# Patient Record
Sex: FEMALE | Race: WHITE | NOT HISPANIC OR LATINO | URBAN - METROPOLITAN AREA
[De-identification: names, ages, dates, MRNs, and addresses within clinical notes are randomized per-mention and may not be internally consistent; named-entity substitution may affect disease eponyms.]

---

## 2019-03-12 ENCOUNTER — INPATIENT (INPATIENT)
Facility: HOSPITAL | Age: 84
LOS: 2 days | Discharge: NURSING, REHAB OR LONG-TERM CARE | DRG: 56 | End: 2019-03-15
Attending: INTERNAL MEDICINE | Admitting: INTERNAL MEDICINE
Payer: MEDICARE

## 2019-03-12 VITALS
SYSTOLIC BLOOD PRESSURE: 177 MMHG | DIASTOLIC BLOOD PRESSURE: 89 MMHG | HEART RATE: 63 BPM | TEMPERATURE: 98 F | HEIGHT: 65 IN | OXYGEN SATURATION: 94 % | RESPIRATION RATE: 18 BRPM | WEIGHT: 125 LBS

## 2019-03-12 DIAGNOSIS — I10 ESSENTIAL (PRIMARY) HYPERTENSION: ICD-10-CM

## 2019-03-12 DIAGNOSIS — D50.9 IRON DEFICIENCY ANEMIA, UNSPECIFIED: ICD-10-CM

## 2019-03-12 DIAGNOSIS — K21.9 GASTRO-ESOPHAGEAL REFLUX DISEASE WITHOUT ESOPHAGITIS: ICD-10-CM

## 2019-03-12 DIAGNOSIS — F03.90 UNSPECIFIED DEMENTIA WITHOUT BEHAVIORAL DISTURBANCE: ICD-10-CM

## 2019-03-12 DIAGNOSIS — Z29.9 ENCOUNTER FOR PROPHYLACTIC MEASURES, UNSPECIFIED: ICD-10-CM

## 2019-03-12 LAB
ALBUMIN SERPL ELPH-MCNC: 4.3 G/DL — SIGNIFICANT CHANGE UP (ref 3.3–5)
ALP SERPL-CCNC: 90 U/L — SIGNIFICANT CHANGE UP (ref 40–120)
ALT FLD-CCNC: 9 U/L — LOW (ref 10–45)
ANION GAP SERPL CALC-SCNC: 12 MMOL/L — SIGNIFICANT CHANGE UP (ref 5–17)
APPEARANCE UR: CLEAR — SIGNIFICANT CHANGE UP
AST SERPL-CCNC: 14 U/L — SIGNIFICANT CHANGE UP (ref 10–40)
BACTERIA # UR AUTO: PRESENT /HPF
BASOPHILS # BLD AUTO: 0.05 K/UL — SIGNIFICANT CHANGE UP (ref 0–0.2)
BASOPHILS NFR BLD AUTO: 0.6 % — SIGNIFICANT CHANGE UP (ref 0–2)
BILIRUB SERPL-MCNC: 0.9 MG/DL — SIGNIFICANT CHANGE UP (ref 0.2–1.2)
BILIRUB UR-MCNC: NEGATIVE — SIGNIFICANT CHANGE UP
BUN SERPL-MCNC: 27 MG/DL — HIGH (ref 7–23)
CALCIUM SERPL-MCNC: 10.4 MG/DL — SIGNIFICANT CHANGE UP (ref 8.4–10.5)
CHLORIDE SERPL-SCNC: 104 MMOL/L — SIGNIFICANT CHANGE UP (ref 96–108)
CO2 SERPL-SCNC: 25 MMOL/L — SIGNIFICANT CHANGE UP (ref 22–31)
COLOR SPEC: YELLOW — SIGNIFICANT CHANGE UP
CREAT SERPL-MCNC: 1.12 MG/DL — SIGNIFICANT CHANGE UP (ref 0.5–1.3)
DIFF PNL FLD: NEGATIVE — SIGNIFICANT CHANGE UP
EOSINOPHIL # BLD AUTO: 0.24 K/UL — SIGNIFICANT CHANGE UP (ref 0–0.5)
EOSINOPHIL NFR BLD AUTO: 3.1 % — SIGNIFICANT CHANGE UP (ref 0–6)
EPI CELLS # UR: SIGNIFICANT CHANGE UP /HPF (ref 0–5)
GLUCOSE BLDC GLUCOMTR-MCNC: 85 MG/DL — SIGNIFICANT CHANGE UP (ref 70–99)
GLUCOSE SERPL-MCNC: 104 MG/DL — HIGH (ref 70–99)
GLUCOSE UR QL: NEGATIVE — SIGNIFICANT CHANGE UP
HCT VFR BLD CALC: 45.5 % — HIGH (ref 34.5–45)
HGB BLD-MCNC: 15.6 G/DL — HIGH (ref 11.5–15.5)
IMM GRANULOCYTES NFR BLD AUTO: 0.4 % — SIGNIFICANT CHANGE UP (ref 0–1.5)
KETONES UR-MCNC: 15 MG/DL
LEUKOCYTE ESTERASE UR-ACNC: NEGATIVE — SIGNIFICANT CHANGE UP
LYMPHOCYTES # BLD AUTO: 1.42 K/UL — SIGNIFICANT CHANGE UP (ref 1–3.3)
LYMPHOCYTES # BLD AUTO: 18.2 % — SIGNIFICANT CHANGE UP (ref 13–44)
MCHC RBC-ENTMCNC: 32.4 PG — SIGNIFICANT CHANGE UP (ref 27–34)
MCHC RBC-ENTMCNC: 34.3 GM/DL — SIGNIFICANT CHANGE UP (ref 32–36)
MCV RBC AUTO: 94.4 FL — SIGNIFICANT CHANGE UP (ref 80–100)
MONOCYTES # BLD AUTO: 0.59 K/UL — SIGNIFICANT CHANGE UP (ref 0–0.9)
MONOCYTES NFR BLD AUTO: 7.6 % — SIGNIFICANT CHANGE UP (ref 2–14)
NEUTROPHILS # BLD AUTO: 5.48 K/UL — SIGNIFICANT CHANGE UP (ref 1.8–7.4)
NEUTROPHILS NFR BLD AUTO: 70.1 % — SIGNIFICANT CHANGE UP (ref 43–77)
NITRITE UR-MCNC: POSITIVE
NRBC # BLD: 0 /100 WBCS — SIGNIFICANT CHANGE UP (ref 0–0)
PH UR: 6 — SIGNIFICANT CHANGE UP (ref 5–8)
PLATELET # BLD AUTO: 287 K/UL — SIGNIFICANT CHANGE UP (ref 150–400)
POTASSIUM SERPL-MCNC: 4.7 MMOL/L — SIGNIFICANT CHANGE UP (ref 3.5–5.3)
POTASSIUM SERPL-SCNC: 4.7 MMOL/L — SIGNIFICANT CHANGE UP (ref 3.5–5.3)
PROT SERPL-MCNC: 7.1 G/DL — SIGNIFICANT CHANGE UP (ref 6–8.3)
PROT UR-MCNC: NEGATIVE MG/DL — SIGNIFICANT CHANGE UP
RBC # BLD: 4.82 M/UL — SIGNIFICANT CHANGE UP (ref 3.8–5.2)
RBC # FLD: 12.9 % — SIGNIFICANT CHANGE UP (ref 10.3–14.5)
RBC CASTS # UR COMP ASSIST: < 5 /HPF — SIGNIFICANT CHANGE UP
SODIUM SERPL-SCNC: 141 MMOL/L — SIGNIFICANT CHANGE UP (ref 135–145)
SP GR SPEC: >=1.03 — SIGNIFICANT CHANGE UP (ref 1–1.03)
TSH SERPL-MCNC: 0.05 UIU/ML — LOW (ref 0.35–4.94)
UROBILINOGEN FLD QL: 0.2 E.U./DL — SIGNIFICANT CHANGE UP
WBC # BLD: 7.81 K/UL — SIGNIFICANT CHANGE UP (ref 3.8–10.5)
WBC # FLD AUTO: 7.81 K/UL — SIGNIFICANT CHANGE UP (ref 3.8–10.5)
WBC UR QL: ABNORMAL /HPF

## 2019-03-12 PROCEDURE — 78608 BRAIN IMAGING (PET): CPT | Mod: 26

## 2019-03-12 PROCEDURE — 99285 EMERGENCY DEPT VISIT HI MDM: CPT | Mod: 25

## 2019-03-12 PROCEDURE — 93010 ELECTROCARDIOGRAM REPORT: CPT

## 2019-03-12 RX ORDER — SENNA PLUS 8.6 MG/1
1 TABLET ORAL
Qty: 0 | Refills: 0 | COMMUNITY

## 2019-03-12 RX ORDER — POLYETHYLENE GLYCOL 3350 17 G/17G
0 POWDER, FOR SOLUTION ORAL
Qty: 0 | Refills: 0 | COMMUNITY

## 2019-03-12 RX ORDER — AMLODIPINE BESYLATE 2.5 MG/1
1 TABLET ORAL
Qty: 0 | Refills: 0 | COMMUNITY

## 2019-03-12 RX ORDER — SODIUM CHLORIDE 9 MG/ML
1000 INJECTION INTRAMUSCULAR; INTRAVENOUS; SUBCUTANEOUS
Qty: 0 | Refills: 0 | Status: DISCONTINUED | OUTPATIENT
Start: 2019-03-12 | End: 2019-03-14

## 2019-03-12 RX ORDER — POLYETHYLENE GLYCOL 3350 17 G/17G
17 POWDER, FOR SOLUTION ORAL DAILY
Qty: 0 | Refills: 0 | Status: DISCONTINUED | OUTPATIENT
Start: 2019-03-12 | End: 2019-03-15

## 2019-03-12 RX ORDER — ASPIRIN/CALCIUM CARB/MAGNESIUM 324 MG
81 TABLET ORAL DAILY
Qty: 0 | Refills: 0 | Status: DISCONTINUED | OUTPATIENT
Start: 2019-03-12 | End: 2019-03-15

## 2019-03-12 RX ORDER — FAMOTIDINE 10 MG/ML
20 INJECTION INTRAVENOUS DAILY
Qty: 0 | Refills: 0 | Status: DISCONTINUED | OUTPATIENT
Start: 2019-03-12 | End: 2019-03-15

## 2019-03-12 RX ORDER — ATORVASTATIN CALCIUM 80 MG/1
40 TABLET, FILM COATED ORAL AT BEDTIME
Qty: 0 | Refills: 0 | Status: DISCONTINUED | OUTPATIENT
Start: 2019-03-12 | End: 2019-03-15

## 2019-03-12 RX ORDER — AMLODIPINE BESYLATE 2.5 MG/1
10 TABLET ORAL DAILY
Qty: 0 | Refills: 0 | Status: DISCONTINUED | OUTPATIENT
Start: 2019-03-12 | End: 2019-03-15

## 2019-03-12 RX ORDER — ACETAMINOPHEN 500 MG
2 TABLET ORAL
Qty: 0 | Refills: 0 | COMMUNITY

## 2019-03-12 RX ORDER — ASPIRIN/CALCIUM CARB/MAGNESIUM 324 MG
1 TABLET ORAL
Qty: 0 | Refills: 0 | COMMUNITY

## 2019-03-12 RX ORDER — SENNA PLUS 8.6 MG/1
1 TABLET ORAL DAILY
Qty: 0 | Refills: 0 | Status: DISCONTINUED | OUTPATIENT
Start: 2019-03-12 | End: 2019-03-15

## 2019-03-12 RX ORDER — ATORVASTATIN CALCIUM 80 MG/1
1 TABLET, FILM COATED ORAL
Qty: 0 | Refills: 0 | COMMUNITY

## 2019-03-12 RX ORDER — RANITIDINE HYDROCHLORIDE 150 MG/1
1 TABLET, FILM COATED ORAL
Qty: 0 | Refills: 0 | COMMUNITY

## 2019-03-12 RX ORDER — HEPARIN SODIUM 5000 [USP'U]/ML
5000 INJECTION INTRAVENOUS; SUBCUTANEOUS EVERY 8 HOURS
Qty: 0 | Refills: 0 | Status: DISCONTINUED | OUTPATIENT
Start: 2019-03-12 | End: 2019-03-15

## 2019-03-12 RX ADMIN — AMLODIPINE BESYLATE 10 MILLIGRAM(S): 2.5 TABLET ORAL at 16:16

## 2019-03-12 RX ADMIN — FAMOTIDINE 20 MILLIGRAM(S): 10 INJECTION INTRAVENOUS at 16:16

## 2019-03-12 RX ADMIN — HEPARIN SODIUM 5000 UNIT(S): 5000 INJECTION INTRAVENOUS; SUBCUTANEOUS at 16:16

## 2019-03-12 RX ADMIN — POLYETHYLENE GLYCOL 3350 17 GRAM(S): 17 POWDER, FOR SOLUTION ORAL at 16:16

## 2019-03-12 RX ADMIN — SODIUM CHLORIDE 90 MILLILITER(S): 9 INJECTION INTRAMUSCULAR; INTRAVENOUS; SUBCUTANEOUS at 16:16

## 2019-03-12 RX ADMIN — ATORVASTATIN CALCIUM 40 MILLIGRAM(S): 80 TABLET, FILM COATED ORAL at 22:56

## 2019-03-12 RX ADMIN — Medication 81 MILLIGRAM(S): at 16:16

## 2019-03-12 RX ADMIN — SENNA PLUS 1 TABLET(S): 8.6 TABLET ORAL at 16:16

## 2019-03-12 NOTE — ED ADULT TRIAGE NOTE - CHIEF COMPLAINT QUOTE
Pt KRISTY from [facility] [place] confusion.  Pt arrived to ED A&Ox3.  Per EMS, pt recently completed course of Abx for a UTI.  Pt states "They think something is wrong so they sent me here but I don't know what."  PT denies N/V/D ,SOB, Fevers, CP and Dizziness.  EKG in progress.

## 2019-03-12 NOTE — PROGRESS NOTE ADULT - SUBJECTIVE AND OBJECTIVE BOX
Patient seen for the first tome  History obtained from her   Patient is awake and alert  VS stable Afeb In RR Systolic murmur to carotids with brisk upstroke Chest is clear Abd is soft No organomegaly No edema Pulses equal Patient cannot sit up but can follow commands and moves all exts well  Labs are unremarkable

## 2019-03-12 NOTE — H&P ADULT - NSHPPHYSICALEXAM_GEN_ALL_CORE
VITALS  Vital Signs Last 24 Hrs  T(C): 36.7 (12 Mar 2019 10:12), Max: 36.7 (12 Mar 2019 10:12)  T(F): 98 (12 Mar 2019 10:12), Max: 98 (12 Mar 2019 10:12)  HR: 57 (12 Mar 2019 10:12) (57 - 63)  BP: 119/54 (12 Mar 2019 10:12) (119/54 - 177/89)  BP(mean): --  RR: 20 (12 Mar 2019 10:12) (18 - 20)  SpO2: 96% (12 Mar 2019 10:12) (94% - 96%)    POCT Blood Glucose.: 85 mg/dL (12 Mar 2019 10:51)    PHYSICAL EXAM  General: NAD; comfortable  HEENT: NC/AT, supple neck, dry, no JVD, cracked lips  Respiratory: CTA B/L; no wheezes/crackles w/ good air movement  Cardiovascular: Regular rhythm/rate; S1/S2  Gastrointestinal: Soft; NTND; bowel sounds normal  Extremities: WWP; no edema; no clubbing; radial/pedal pulses palpable  Neurological:  A&Ox0-1; PERRL; EOMI; CNII-XII grossly intact; 5/5 strength; sensation intact; reflexes 2+; no obvious focal deficits  Skin: No rashes or ulcers, normal tugor

## 2019-03-12 NOTE — CONSULT NOTE ADULT - SUBJECTIVE AND OBJECTIVE BOX
89 YO female here for ?neuro testing  +Hx htn but  denies sig.. cardiac Hx  EKF sinus with LBBB  Exam: post-extra-systolic systolic murmur LSB-doubt significance  questions include whether to put on Holter or do echo-given   apparently was at [facility] 6 weeks ago and may have had these tests there-  will try and find out

## 2019-03-12 NOTE — ED PROVIDER NOTE - CARE PLAN
Principal Discharge DX:	Dementia without behavioral disturbance, unspecified dementia type  Assessment and plan of treatment:	progressive dementia over several weeks to months. neurologist suspects vascular dementia.

## 2019-03-12 NOTE — ED ADULT NURSE NOTE - NSIMPLEMENTINTERV_GEN_ALL_ED
Implemented All Fall with Harm Risk Interventions:  [place] to call system. Call bell, personal items and telephone within reach. Instruct patient to call for assistance. Room bathroom lighting operational. Non-slip footwear when patient is off stretcher. Physically safe environment: no spills, clutter or unnecessary equipment. Stretcher in lowest position, wheels locked, appropriate side rails in place. Provide visual cue, wrist band, yellow gown, etc. Monitor gait and stability. Monitor for mental status changes and reorient to person, place, and time. Review medications for side effects contributing to fall risk. Reinforce activity limits and safety measures with patient and family. Provide visual clues: red socks.

## 2019-03-12 NOTE — ED PROVIDER NOTE - CLINICAL SUMMARY MEDICAL DECISION MAKING FREE TEXT BOX
89F with CVA, NSTEMI, CAD, GERD, constipation p/w progressive dementia. Slightly hypertensive, otherwise VSS. Labs unremarkable. UA collected. Admit to medicine for w/u dementia as per Dr. Martinez.

## 2019-03-12 NOTE — ED PROVIDER NOTE - OBJECTIVE STATEMENT
Pt is a 89F with a history of CAD, NSTEMI, GERD, UTI in January (treated) presenting from [facility] for progressive dementia. Dementia has progressed over the past several weeks-months. She also has had progressive weakness. She says it is a generalized weakness, making it difficult to ambulate. She says she hasn't ambulated in 10 days. She denies feeling confused. Denies HA, numbness/tingling, blurry vision, double vision, focal weakness, saddle anesthesia, back pain, incontinence. Denies F/C, congestion, sore throat, cough, CP, palpitations, SOB, abd pain, N/V/D. Reports chronic constipation. Passing flatus. Denies blood in the stool. Denies dysuria. ROS otherwise neg. Outpt neurologist Dr. Avilez suspects she has vascular dementia. She denies feeling depressed. Denies hallucinations. ROS otherwise neg.

## 2019-03-12 NOTE — CONSULT NOTE ADULT - SUBJECTIVE AND OBJECTIVE BOX
Patient is a 89y old  Female who presents with a chief complaint of progressive weakness and altered mental status      HPI:      Allergies  Bactrim (Rash)  penicillins (Rash)      Health Issues  MEWS Score  Dementia  Dermatitis  Iron deficiency anemia  Folate deficiency anemia  HTN (hypertension)  Agoraphobia with panic disorder  Vitamin D deficiency  Depressive disorder  Anxiety  Constipation  GERD (gastroesophageal reflux disease)  Spinal stenosis  MI (myocardial infarction)  Cerebral infarction  No significant past surgical history  CONFUSION        FAMILY HISTORY:      MEDICATIONS  (STANDING):    MEDICATIONS  (PRN):      PAST MEDICAL & SURGICAL HISTORY:  Dementia  Dermatitis  Iron deficiency anemia  Folate deficiency anemia  HTN (hypertension)  Agoraphobia with panic disorder  Vitamin D deficiency  Depressive disorder  Anxiety  Constipation  GERD (gastroesophageal reflux disease)  Spinal stenosis  MI (myocardial infarction)  Cerebral infarction  No significant past surgical history      Labs                          15.6   7.81  )-----------( 287      ( 12 Mar 2019 09:09 )             45.5             Radiology:    Physical Exam    MENTAL STATUS  -Level of Consciousness- awake    Orientation- person, time  Language- aphasia/ dysarthria  Memory- recent and remote- poor      Cranial Nerve 1- 12  Pupils- equal and reactive  Eye movements- full  Facial - no asymmetry   Lower CN-nl    Gait and Station- unsteady    MOTOR  Upper- no drift  Lower- no foot drop    Reflexes- decreased    Sensation- no sensory level    Cerebellar- no tremors    vascular - no bruits    Assessment- MCI r/o dementia    Plan Dr Yarbrough - psych , Dr Fields , Dr Rose  48hr EEG, PET head - r/o Alzheimer's   TSH, anti NMDA, anti MAG, VGKC, TPO antibodies,  B12, RPR, LORA

## 2019-03-12 NOTE — H&P ADULT - NSHPLABSRESULTS_GEN_ALL_CORE
LABS                        15.6   7.81  )-----------( 287      ( 12 Mar 2019 09:09 )             45.5     03-12    141  |  104  |  27<H>  ----------------------------<  104<H>  4.7   |  25  |  1.12    Ca    10.4      12 Mar 2019 09:09    TPro  7.1  /  Alb  4.3  /  TBili  0.9  /  DBili  x   /  AST  14  /  ALT  9<L>  /  AlkPhos  90  03-12

## 2019-03-12 NOTE — ED PROVIDER NOTE - PHYSICAL EXAMINATION
General:  NAD, nontoxic appearing, WDWN, elderly  HENT:  EOMI, PERRL.  No sinus tenderness.  oropharynx WNL.  MM slightly dry  Neck:  Trachea midline.  No JVD, LAD, or thyromegaly.  Heart:  S1S2 no M/R/G, rrr  Lungs:  CTAB no wheezing, rhonchi or rales.  No accessory muscle use.  No respiratory distress.  Abdomen:  NABS.  soft, nontender, nondistended.  no guarding.  no ascites.  no organomegaly.  Vascular:  Peripheral pulses palpable  Extremities:  No edema  Back:  No CVA tenderness  Neuro:  AOx1.5 (not name of H or year), no facial asymmetry, nonfocal, no slurred speech. Strength 5/5 throughout. Sensation to light touch intact throughout. Patellar reflexes brisk. Gait deferred. Finger to nose WNL.   Skin:  No rash

## 2019-03-12 NOTE — ED ADULT NURSE NOTE - CHIEF COMPLAINT QUOTE
Pt KRISTY from [facility] [place] confusion.  Pt arrived to ED A&Ox3.  Per EMS, pt recently completed course of Abx for a UTI.  Pt states "They think something is wrong so they sent me here but I don't know what."  PT denies N/V/D ,SOB, Fevers, CP and Dizziness.  EKG in progress.

## 2019-03-12 NOTE — ED ADULT TRIAGE NOTE - SOURCE OF INFORMATION
Please call Dr Powell's office  Monday March 11th, 2019 and make a one week follow up appointment to see him Patient

## 2019-03-12 NOTE — H&P ADULT - HISTORY OF PRESENT ILLNESS
89F w/ dementia presents w/ chronically progressively worsening mental status. 89F w/ dementia presents w/ chronically progressively worsening mental status.  History obtain from ED chart note as patient unable to provide history and upon asking , he said that they recommend she come in but did not know why.

## 2019-03-12 NOTE — ED PROVIDER NOTE - ATTENDING CONTRIBUTION TO CARE
89yo F hx of CVA w/ proggressive weakness over months, CAD c/b NSTEMI, GERD, recent UTI in Jan, sent from NH today for evaluation. No reason for transfer on NH paperwork. Pt poor historian. will reach out to pts pcp and family to further assess. 89yo F hx of CVA w/ proggressive weakness over months, CAD c/b NSTEMI, GERD, recent UTI in Jan, sent from NH today for evaluation. No reason for transfer on NH paperwork. Pt poor historian. will reach out to pts pcp and family to further assess.    on d/w dr barrett - to admit for workup of weakness, likely dementia

## 2019-03-12 NOTE — H&P ADULT - ASSESSMENT
89F w/ progressively worsening dementia presents for work up w/ concern for vascular dementia vs advanced alzheimers, unlikely toxic metabolic encephalitis.

## 2019-03-12 NOTE — ED ADULT NURSE NOTE - PMH
Agoraphobia with panic disorder    Anxiety    Cerebral infarction    Constipation    Dementia    Depressive disorder    Dermatitis    Folate deficiency anemia    GERD (gastroesophageal reflux disease)    HTN (hypertension)    Iron deficiency anemia    MI (myocardial infarction)    Spinal stenosis    Vitamin D deficiency

## 2019-03-12 NOTE — H&P ADULT - NSICDXPROBLEM_GEN_ALL_CORE_FT
PROBLEM DIAGNOSES  Problem: Dementia without behavioral disturbance, unspecified dementia type  Assessment and Plan: Acutely worsening on chronic dementia.      Problem: Need for prophylactic measure  Assessment and Plan:

## 2019-03-13 LAB
ANION GAP SERPL CALC-SCNC: 11 MMOL/L — SIGNIFICANT CHANGE UP (ref 5–17)
BUN SERPL-MCNC: 27 MG/DL — HIGH (ref 7–23)
CALCIUM SERPL-MCNC: 9.4 MG/DL — SIGNIFICANT CHANGE UP (ref 8.4–10.5)
CHLORIDE SERPL-SCNC: 109 MMOL/L — HIGH (ref 96–108)
CO2 SERPL-SCNC: 22 MMOL/L — SIGNIFICANT CHANGE UP (ref 22–31)
CREAT SERPL-MCNC: 0.99 MG/DL — SIGNIFICANT CHANGE UP (ref 0.5–1.3)
GLUCOSE SERPL-MCNC: 87 MG/DL — SIGNIFICANT CHANGE UP (ref 70–99)
HCT VFR BLD CALC: 41.6 % — SIGNIFICANT CHANGE UP (ref 34.5–45)
HGB BLD-MCNC: 14 G/DL — SIGNIFICANT CHANGE UP (ref 11.5–15.5)
MAGNESIUM SERPL-MCNC: 1.6 MG/DL — SIGNIFICANT CHANGE UP (ref 1.6–2.6)
MCHC RBC-ENTMCNC: 32.1 PG — SIGNIFICANT CHANGE UP (ref 27–34)
MCHC RBC-ENTMCNC: 33.7 GM/DL — SIGNIFICANT CHANGE UP (ref 32–36)
MCV RBC AUTO: 95.4 FL — SIGNIFICANT CHANGE UP (ref 80–100)
NRBC # BLD: 0 /100 WBCS — SIGNIFICANT CHANGE UP (ref 0–0)
PLATELET # BLD AUTO: 266 K/UL — SIGNIFICANT CHANGE UP (ref 150–400)
POTASSIUM SERPL-MCNC: SIGNIFICANT CHANGE UP MMOL/L (ref 3.5–5.3)
POTASSIUM SERPL-SCNC: SIGNIFICANT CHANGE UP MMOL/L (ref 3.5–5.3)
RBC # BLD: 4.36 M/UL — SIGNIFICANT CHANGE UP (ref 3.8–5.2)
RBC # FLD: 12.8 % — SIGNIFICANT CHANGE UP (ref 10.3–14.5)
SODIUM SERPL-SCNC: 142 MMOL/L — SIGNIFICANT CHANGE UP (ref 135–145)
T4 AB SER-ACNC: 7.59 UG/DL — SIGNIFICANT CHANGE UP (ref 3.17–11.72)
T4 FREE SERPL-MCNC: 1.18 NG/DL — SIGNIFICANT CHANGE UP (ref 0.7–1.48)
VIT B12 SERPL-MCNC: 1544 PG/ML — HIGH (ref 232–1245)
WBC # BLD: 8.2 K/UL — SIGNIFICANT CHANGE UP (ref 3.8–10.5)
WBC # FLD AUTO: 8.2 K/UL — SIGNIFICANT CHANGE UP (ref 3.8–10.5)

## 2019-03-13 PROCEDURE — 95951: CPT | Mod: 26

## 2019-03-13 PROCEDURE — 99222 1ST HOSP IP/OBS MODERATE 55: CPT

## 2019-03-13 RX ADMIN — ATORVASTATIN CALCIUM 40 MILLIGRAM(S): 80 TABLET, FILM COATED ORAL at 21:32

## 2019-03-13 RX ADMIN — SODIUM CHLORIDE 90 MILLILITER(S): 9 INJECTION INTRAMUSCULAR; INTRAVENOUS; SUBCUTANEOUS at 21:32

## 2019-03-13 RX ADMIN — SODIUM CHLORIDE 90 MILLILITER(S): 9 INJECTION INTRAMUSCULAR; INTRAVENOUS; SUBCUTANEOUS at 03:33

## 2019-03-13 RX ADMIN — HEPARIN SODIUM 5000 UNIT(S): 5000 INJECTION INTRAVENOUS; SUBCUTANEOUS at 14:00

## 2019-03-13 RX ADMIN — HEPARIN SODIUM 5000 UNIT(S): 5000 INJECTION INTRAVENOUS; SUBCUTANEOUS at 00:48

## 2019-03-13 RX ADMIN — SENNA PLUS 1 TABLET(S): 8.6 TABLET ORAL at 13:26

## 2019-03-13 RX ADMIN — AMLODIPINE BESYLATE 10 MILLIGRAM(S): 2.5 TABLET ORAL at 06:19

## 2019-03-13 RX ADMIN — POLYETHYLENE GLYCOL 3350 17 GRAM(S): 17 POWDER, FOR SOLUTION ORAL at 13:25

## 2019-03-13 RX ADMIN — FAMOTIDINE 20 MILLIGRAM(S): 10 INJECTION INTRAVENOUS at 13:28

## 2019-03-13 RX ADMIN — HEPARIN SODIUM 5000 UNIT(S): 5000 INJECTION INTRAVENOUS; SUBCUTANEOUS at 07:48

## 2019-03-13 RX ADMIN — Medication 81 MILLIGRAM(S): at 13:25

## 2019-03-13 NOTE — DIETITIAN INITIAL EVALUATION ADULT. - ENERGY NEEDS
Height 65"; #; #; 100%IBW  BMI 20.8  ABW used for calculations as pt between % of IBW. Needs estimated for maintenance in older adults

## 2019-03-13 NOTE — PROGRESS NOTE ADULT - NSICDXPROBLEM_GEN_ALL_CORE_FT
PROBLEM DIAGNOSES  Problem: Dementia without behavioral disturbance, unspecified dementia type  Assessment and Plan: Acutely worsening on chronic dementia.    - neurology following  - ordered Thyroid peroxidase CPK, myoglobin, RF, SSA, SSA, CRP, SPEP, , LORA  - pending MRI head  - on VEEG     Problem: GERD (gastroesophageal reflux disease)  Assessment and Plan: hx of GERD  - c/w Pepcid 20mg QD     Problem: HTN (hypertension)  Assessment and Plan: hx of HTN  - c/w Norvasc 10mg QD      Problem: Need for prophylactic measure  Assessment and Plan: F c/w IVF NS @ 90cc/hr  E replete prn  N Regular diet with Ensure

## 2019-03-13 NOTE — CONSULT NOTE ADULT - SUBJECTIVE AND OBJECTIVE BOX
Patient is a 89y old  Female who presents with a chief complaint of Progressively worsening dementia (13 Mar 2019 07:18)       HPI:  89F w/ dementia presents w/ chronically progressively worsening mental status.  History obtain from ED chart note as patient unable to provide history and upon asking , he said that they recommend she come in but did not know why. (12 Mar 2019 13:37)      PAST MEDICAL & SURGICAL HISTORY:  Dementia  Dermatitis  Iron deficiency anemia  Folate deficiency anemia  HTN (hypertension)  Agoraphobia with panic disorder  Vitamin D deficiency  Depressive disorder  Anxiety  Constipation  GERD (gastroesophageal reflux disease)  Spinal stenosis  MI (myocardial infarction)  Cerebral infarction  No significant past surgical history      MEDICATIONS  (STANDING):  amLODIPine   Tablet 10 milliGRAM(s) Oral daily  aspirin enteric coated 81 milliGRAM(s) Oral daily  atorvastatin 40 milliGRAM(s) Oral at bedtime  famotidine    Tablet 20 milliGRAM(s) Oral daily  heparin  Injectable 5000 Unit(s) SubCutaneous every 8 hours  polyethylene glycol 3350 17 Gram(s) Oral daily  senna 1 Tablet(s) Oral daily  sodium chloride 0.9%. 1000 milliLiter(s) (90 mL/Hr) IV Continuous <Continuous>    MEDICATIONS  (PRN):      Social History: per medical chart, lives with her  in an elevator accessible apartment building, ? HHA    Functional Level Prior to Admission: unknown patient is A&O x 1    FAMILY HISTORY:  No pertinent family history      CBC Full  -  ( 13 Mar 2019 05:39 )  WBC Count : 8.20 K/uL  Hemoglobin : 14.0 g/dL  Hematocrit : 41.6 %  Platelet Count - Automated : 266 K/uL  Mean Cell Volume : 95.4 fl  Mean Cell Hemoglobin : 32.1 pg  Mean Cell Hemoglobin Concentration : 33.7 gm/dL  Auto Neutrophil # : x  Auto Lymphocyte # : x  Auto Monocyte # : x  Auto Eosinophil # : x  Auto Basophil # : x  Auto Neutrophil % : x  Auto Lymphocyte % : x  Auto Monocyte % : x  Auto Eosinophil % : x  Auto Basophil % : x      03-13    142  |  109<H>  |  27<H>  ----------------------------<  87  See note   |  22  |  0.99    Ca    9.4      13 Mar 2019 05:38  Mg     1.6     03-13    TPro  7.1  /  Alb  4.3  /  TBili  0.9  /  DBili  x   /  AST  14  /  ALT  9<L>  /  AlkPhos  90  03-12      Urinalysis Basic - ( 12 Mar 2019 18:05 )    Color: Yellow / Appearance: Clear / SG: >=1.030 / pH: x  Gluc: x / Ketone: 15 mg/dL  / Bili: Negative / Urobili: 0.2 E.U./dL   Blood: x / Protein: NEGATIVE mg/dL / Nitrite: POSITIVE   Leuk Esterase: NEGATIVE / RBC: < 5 /HPF / WBC 5-10 /HPF   Sq Epi: x / Non Sq Epi: 0-5 /HPF / Bacteria: Present /HPF          Radiology:              Vital Signs Last 24 Hrs  T(C): 36.2 (13 Mar 2019 04:58), Max: 36.8 (12 Mar 2019 16:10)  T(F): 97.1 (13 Mar 2019 04:58), Max: 98.2 (12 Mar 2019 16:10)  HR: 64 (13 Mar 2019 04:58) (57 - 71)  BP: 132/82 (13 Mar 2019 04:58) (119/54 - 158/88)  BP(mean): --  RR: 19 (13 Mar 2019 04:58) (18 - 20)  SpO2: 97% (13 Mar 2019 04:58) (95% - 97%)    REVIEW OF SYSTEMS: unable to obtain , patient is A&O x 1    CONSTITUTIONAL: No fever, weight loss, or fatigue  EYES: No eye pain, visual disturbances, or discharge  ENMT:  No difficulty hearing, tinnitus, vertigo; No sinus or throat pain  NECK: No pain or stiffness  BREASTS: No pain, masses, or nipple discharge  RESPIRATORY: No cough, wheezing, chills or hemoptysis; No shortness of breath  CARDIOVASCULAR: No chest pain, palpitations, dizziness, or leg swelling  GASTROINTESTINAL: No abdominal or epigastric pain. No nausea, vomiting, or hematemesis; No diarrhea or constipation. No melena or hematochezia.  GENITOURINARY: No dysuria, frequency, hematuria, or incontinence  NEUROLOGICAL: No headaches, memory loss, loss of strength, numbness, or tremors  SKIN: No itching, burning, rashes, or lesions   LYMPH NODES: No enlarged glands  ENDOCRINE: No heat or cold intolerance; No hair loss  MUSCULOSKELETAL: No joint pain or swelling; No muscle, back, or extremity pain  PSYCHIATRIC: No depression, anxiety, mood swings, or difficulty sleeping  HEME/LYMPH: No easy bruising, or bleeding gums  ALLERGY AND IMMUNOLOGIC: No hives or eczema  VASCULAR: no swelling, erythema      Physical Exam: frail 89 yo  woman lying in semi Harris's position, NAD    Head: normocephalic, atraumatic    Eyes: PERRLA, EOMI, no nystagmus, sclera anicteric    ENT: nasal discharge, uvula midline, no oropharyngeal erythema/exudate    Neck: supple, negative JVD, negative carotid bruits, no thyromegaly    Chest: CTA bilaterally, neg wheeze, rhonchi, rales, crackles, egophany    Cardiovascular: regular rate and rhythm, II/VI CLARITA    Abdomen: soft, non distended, non tender, negative rebound/guarding, normal bowel sounds, neg hepatosplenomegaly    Extremities: WWP, neg cyanosis/clubbing/edema, negative calf tenderness to palpation, negative Wojciech's sign    :     Neurologic Exam:    Alert and oriented x 1 to person, speech fluent w/o dysarthria, follows commands    Cranial Nerves:     II:                       pupils equal, round and reactive to light, visual fields intact   III/ IV/VI:            extraocular movements intact, neg nystagmus, ptosis  V:                       facial sensation intact, V1-3 normal  VII:                     face symmetric, no droop, normal eye closure and smile  VIII:                    hearing intact to finger rub bilaterally  IX/ X:                 soft palate rise symmetrical  XI:                      head turning, shoulder shrug normal  XII:                     tongue midline    Motor Exam:    Upper Extremities:     RIght:   poor effort > 3/5    Left :    poor effort > 3/5    Lower Extremities:                 Right:      poor effort > 3/5    Left:       poor effort > 3/5      Sensory:    intact to LT/PP in all UE/LE dermatomes    DTR:            = biceps/     triceps/     brachioradialis                      = patella/   medial hamstring/ankle                      neg clonus                      neg Babinski                      neg Hoffmans      Gait:  not tested        PM&R Impression:    1) deconditioned  2) gait dysfunction  3) dementia          Recommendations:    1) Physical therapy focusing on therapeutic exercises, bed mobility/transfer out of bed evaluation, progressive ambulation with assistive devices prn.    2) Anticipated Disposition Plan/Recs: anticipate subacute rehab placement

## 2019-03-13 NOTE — DIETITIAN INITIAL EVALUATION ADULT. - OTHER INFO
89 yo/female with PMHx dementia, anemia, presented from NH with worsening mental status. Unlikely TME, more likely worsening dementia/alzheimers. Pt seen in room, awake, alert, verbally responsive to questions but very confused. Pt currently restrained d/t EEG and very unhappy about it; RN untied her at time of visit but still thinks she is restrained. Noted w/~25% intake at breakfast; pt complaining that she couldn't eat bc of restraints. Explained for lunch that she would be able to eat on her own. Pt unable to tell RD much about diet history PTA. She states that she does not usually drink Ensure shakes. NKFA. No N/V/C/D reported at this time. She denies difficulty chewing or swallowing, though would maintain aspiration precautions at all times. Skin intact pressure-wise. Pt denies pain. NFPE not warranted at this time. Education not appropriate. Encouraged PO intake.

## 2019-03-13 NOTE — PROGRESS NOTE ADULT - SUBJECTIVE AND OBJECTIVE BOX
No change overnight VS stable Afeb In RR murmur unchanged Chest is clear TSH decreased  Patient not on synthroid according to records

## 2019-03-13 NOTE — DIETITIAN INITIAL EVALUATION ADULT. - CURRENT DIET ORDER MEETS ESTIMATED NUTRIENT REQUIREMENTS
Discharging patient for primary nurse Sasha Astudillo RN. Discharge instructions given to patient, patient verbalizes understanding of instructions. Patient alert and oriented x3, denies pain or shortness of breath at this time. Patient ambulatory, gait steady. Patient armband removed and given to patient to take home.   Patient was informed of the privacy risks if armband lost or stolen Statement Selected

## 2019-03-13 NOTE — CONSULT NOTE ADULT - SUBJECTIVE AND OBJECTIVE BOX
89 year old female presents with altered mental status   Rheumatology consulted for evaluation of a possible myopathy in setting of lower extremity weakness.   Patient unable to provide a reliable history today.   Denies proximal weakness in upper or lower extremity.   Denies pain in her pelvic or shoulder girdle.     PMH anxiety, CVA, dementia, GERD, CAD, HTN  PSH none  FH unable to obtain   SH no tobacco, EtoH  Meds see med rec  All none     VSS  Physical exam notable for the following pertinent positives/negatives:  Comfortable  Can raise arms over head  5/5 power in lower extremities   CV RRR no MRP  Resp CTAB  No rashes  No synovitis   No temporal tenderness    Data reviewed notable for:   Normal CBC  Normal CMP

## 2019-03-13 NOTE — CONSULT NOTE ADULT - ASSESSMENT
89 year old female presents with altered mental status   Rheumatology consulted for evaluation of a possible myopathy in setting of lower extremity weakness.   Her exam exhibits full power in her upper and lower extremities and she has no pain on raising her arms overhead or lifting her legs from the bed. She has no additional stigmata of inflammatory arthritis, myositis or GCA.   Would not evaluate further at this time.   Feel free to call with any questions

## 2019-03-13 NOTE — PROGRESS NOTE ADULT - SUBJECTIVE AND OBJECTIVE BOX
Neurology Follow up note    Name  LEONARDO MCCORMACK    HPI:  89F w/ dementia presents w/ chronically progressively worsening mental status.  History obtain from ED chart note as patient unable to provide history and upon asking , he said that they recommend she come in but did not know why. (12 Mar 2019 13:37)      Interval History - poor memory - proximal weakness in the LE- no headaches or dizziness        REVIEW OF SYSTEMS    Vital Signs Last 24 Hrs  T(C): 36.2 (13 Mar 2019 04:58), Max: 36.8 (12 Mar 2019 16:10)  T(F): 97.1 (13 Mar 2019 04:58), Max: 98.2 (12 Mar 2019 16:10)  HR: 64 (13 Mar 2019 04:58) (57 - 71)  BP: 132/82 (13 Mar 2019 04:58) (119/54 - 177/89)  BP(mean): --  RR: 19 (13 Mar 2019 04:58) (18 - 20)  SpO2: 97% (13 Mar 2019 04:58) (94% - 97%)    Physical Exam-     Mental Status- awake - poor memory    Cranial Nerves- full EOM    Gait and station- n/a    Motor- moves both LE with proximal     Reflexes- decreased    Sensation- decreased    Coordination- decreased    Vascular - no bruits    Medications  amLODIPine   Tablet 10 milliGRAM(s) Oral daily  aspirin enteric coated 81 milliGRAM(s) Oral daily  atorvastatin 40 milliGRAM(s) Oral at bedtime  famotidine    Tablet 20 milliGRAM(s) Oral daily  heparin  Injectable 5000 Unit(s) SubCutaneous every 8 hours  polyethylene glycol 3350 17 Gram(s) Oral daily  senna 1 Tablet(s) Oral daily  sodium chloride 0.9%. 1000 milliLiter(s) IV Continuous <Continuous>      Lab      Radiology    Assessment- MCI with weakness    Plan Rheumatology - r/o polymyalgia , Endocrine, Psych - Dr haile, Rehab, cardiology   Thyroid peroxidase CPK, myoglobin, RF, SSA, SSA, CRP, SPEP, , LORA, MRI head, xrays total spine

## 2019-03-13 NOTE — PHYSICAL THERAPY INITIAL EVALUATION ADULT - CRITERIA FOR SKILLED THERAPEUTIC INTERVENTIONS
impairments found/rehab potential/anticipated discharge recommendation/functional limitations in following categories/therapy frequency

## 2019-03-13 NOTE — CONSULT NOTE ADULT - REASON FOR ADMISSION
Progressive mental status
Progressively worsening dementia

## 2019-03-13 NOTE — CONSULT NOTE ADULT - SUBJECTIVE AND OBJECTIVE BOX
HPI: 89yFemale with hx of rapidly progressive dementia admitted for management of worsening mental status.   Pt is very pleasant and denies all symptoms, denies any known hx of thyroid disease, any symptoms of thyroid disease, no radiation exposure, no hx of amiodarone use.  Pt does have hx of lithium use per review of outpatient medication pharmacy history.  pt         Home FSG:  Diet:    Complications:  Outpatient Endo:    PMH & Surgical Hx:  CONFUSION DEMENTIA  Dementia  Dermatitis  Iron deficiency anemia  Folate deficiency anemia  HTN (hypertension)  Agoraphobia with panic disorder  Vitamin D deficiency  Depressive disorder  Anxiety  Constipation  GERD (gastroesophageal reflux disease)  Spinal stenosis  MI (myocardial infarction)  Cerebral infarction    FH:  DM: pt denies  Thyroid: pt denies  Autoimmune: pt denies  Other:    SH:  Smoking: pt denies  Etoh: reports daily wine  Recreational Drugs: denies  Social Life: reports is active.     Current Meds:  amLODIPine   Tablet 10 milliGRAM(s) Oral daily  aspirin enteric coated 81 milliGRAM(s) Oral daily  atorvastatin 40 milliGRAM(s) Oral at bedtime  famotidine    Tablet 20 milliGRAM(s) Oral daily  heparin  Injectable 5000 Unit(s) SubCutaneous every 8 hours  polyethylene glycol 3350 17 Gram(s) Oral daily  senna 1 Tablet(s) Oral daily  sodium chloride 0.9%. 1000 milliLiter(s) IV Continuous <Continuous>      Allergies:  Bactrim (Rash)  penicillins (Rash)      ROS:  Denies the following except as indicated.    General: weight loss/weight gain, decreased appetite, fatigue  Eyes: Blurry vision, double vision, visual changes  ENT: Throat pain, changes in voice,   CV: palpitations, SOB, CP, cough  GI: NVD, difficulty swallowing, abdominal pain  : polyuria, dysuria  Endo: abnormal menses, temperature intolerance, decreased libido  MSK: weakness, joint pain  Skin: rash, dryness, diaphoresis  Heme: Easy bruising,bleeding  Neuro: HA, dizziness, lightheadedness, numbness tingling  Psych: Anxiety, Depression    Vital Signs Last 24 Hrs  T(C): 36.5 (13 Mar 2019 10:27), Max: 36.8 (12 Mar 2019 16:10)  T(F): 97.7 (13 Mar 2019 10:27), Max: 98.2 (12 Mar 2019 16:10)  HR: 66 (13 Mar 2019 10:27) (64 - 71)  BP: 132/66 (13 Mar 2019 10:27) (129/79 - 158/88)  BP(mean): --  RR: 16 (13 Mar 2019 10:27) (16 - 19)  SpO2: 94% (13 Mar 2019 10:27) (94% - 97%)  Height (cm): 165.1 (03-12 @ 08:44)  Weight (kg): 56.7 (03-12 @ 08:44)  BMI (kg/m2): 20.8 (03-12 @ 08:44)    Constitutional: wn/wd in NAD.   HEENT: NCAT, MMM, OP clear, EOMI, , no proptosis or lid retraction  Neck: no thyromegaly or palpable thyroid nodules   Respiratory: lungs CTAB.  Cardiovascular: regular rhythm, normal S1 and S2, no audible murmurs, no peripheral edema  GI: soft, NT/ND, no masses/HSM appreciated.  Neurology: no tremors, DTR 2+  Skin: no visible rashes/lesions  Psychiatric: AAO x 3, normal affect/mood.  Ext: radial pulses intact, DP pulses intact, extremities warm, no cyanosis, clubbing or edema.       LABS:                        14.0   8.20  )-----------( 266      ( 13 Mar 2019 05:39 )             41.6     03-13    142  |  109<H>  |  27<H>  ----------------------------<  87  See note   |  22  |  0.99    Ca    9.4      13 Mar 2019 05:38  Mg     1.6     03-13    TPro  7.1  /  Alb  4.3  /  TBili  0.9  /  DBili  x   /  AST  14  /  ALT  9<L>  /  AlkPhos  90  03-12      Urinalysis Basic - ( 12 Mar 2019 18:05 )    Color: Yellow / Appearance: Clear / SG: >=1.030 / pH: x  Gluc: x / Ketone: 15 mg/dL  / Bili: Negative / Urobili: 0.2 E.U./dL   Blood: x / Protein: NEGATIVE mg/dL / Nitrite: POSITIVE   Leuk Esterase: NEGATIVE / RBC: < 5 /HPF / WBC 5-10 /HPF   Sq Epi: x / Non Sq Epi: 0-5 /HPF / Bacteria: Present /HPF    Thyroid Stimulating Hormone, Serum: 0.049 (03-12 @ 16:44)  Free Thyroxine, Serum (03.13.19 @ 05:38)    Free Thyroxine, Serum: 1.18 ng/dL    CAPILLARY BLOOD GLUCOSE HPI: 89yFemale with hx of rapidly progressive dementia admitted for management of worsening mental status.   Pt is very pleasant and denies all symptoms, denies any known hx of thyroid disease, any symptoms of thyroid disease, no radiation exposure, no hx of amiodarone use.  Pt does have hx of lithium use per review of outpatient medication pharmacy history.  Outpatient medication reviewed.   Pt reprots good PO intake, no recent weight loss or gain. no problems with constipation or diarrhea.     Medicine and Neurology note reveiwed.   Attempted to reach pt;s  for corroboration of pt supplied history.     PMH & Surgical Hx:  Dementia  Dermatitis  Iron deficiency anemia  Folate deficiency anemia  HTN (hypertension)  Agoraphobia with panic disorder  Vitamin D deficiency  Depressive disorder  Anxiety  Constipation  GERD (gastroesophageal reflux disease)  Spinal stenosis  MI (myocardial infarction)  Cerebral infarction    FH:  DM: pt denies  Thyroid: pt denies  Autoimmune: pt denies  Other:    SH:  Smoking: pt denies  Etoh: reports daily wine  Recreational Drugs: denies  Social Life: reports is active.     Current Meds:  amLODIPine   Tablet 10 milliGRAM(s) Oral daily  aspirin enteric coated 81 milliGRAM(s) Oral daily  atorvastatin 40 milliGRAM(s) Oral at bedtime  famotidine    Tablet 20 milliGRAM(s) Oral daily  heparin  Injectable 5000 Unit(s) SubCutaneous every 8 hours  polyethylene glycol 3350 17 Gram(s) Oral daily  senna 1 Tablet(s) Oral daily  sodium chloride 0.9%. 1000 milliLiter(s) IV Continuous <Continuous>      Allergies:  Bactrim (Rash)  penicillins (Rash)      ROS:  Denies the following except as indicated.    General: weight loss/weight gain, decreased appetite, fatigue  Eyes: Blurry vision, double vision, visual changes  ENT: Throat pain, changes in voice,   CV: palpitations, SOB, CP, cough  GI: NVD, difficulty swallowing, abdominal pain  : polyuria, dysuria  Endo: abnormal menses, temperature intolerance, decreased libido  MSK: weakness, joint pain  Skin: rash, dryness, diaphoresis  Heme: Easy bruising,bleeding  Neuro: HA, dizziness, lightheadedness, numbness tingling  Psych: Anxiety, Depression    Vital Signs Last 24 Hrs  T(C): 36.5 (13 Mar 2019 10:27), Max: 36.8 (12 Mar 2019 16:10)  T(F): 97.7 (13 Mar 2019 10:27), Max: 98.2 (12 Mar 2019 16:10)  HR: 66 (13 Mar 2019 10:27) (64 - 71)  BP: 132/66 (13 Mar 2019 10:27) (129/79 - 158/88)  BP(mean): --  RR: 16 (13 Mar 2019 10:27) (16 - 19)  SpO2: 94% (13 Mar 2019 10:27) (94% - 97%)  Height (cm): 165.1 (03-12 @ 08:44)  Weight (kg): 56.7 (03-12 @ 08:44)  BMI (kg/m2): 20.8 (03-12 @ 08:44)    Constitutional: wn/wd in NAD.   HEENT: NCAT, MMM, OP clear, EOMI, , no proptosis or lid retraction  Neck: no thyromegaly or palpable thyroid nodules   Respiratory: lungs CTAB.  Cardiovascular: regular rhythm, normal S1 and S2, no audible murmurs, no peripheral edema  GI: soft, NT/ND, no masses/HSM appreciated.  Neurology: no tremors, DTR 2+  Skin: no visible rashes/lesions  Psychiatric: AAO x 3, normal affect/mood.  Ext: radial pulses intact, DP pulses intact, extremities warm, no cyanosis, clubbing or edema.       LABS:                        14.0   8.20  )-----------( 266      ( 13 Mar 2019 05:39 )             41.6     03-13    142  |  109<H>  |  27<H>  ----------------------------<  87  See note   |  22  |  0.99    Ca    9.4      13 Mar 2019 05:38  Mg     1.6     03-13    TPro  7.1  /  Alb  4.3  /  TBili  0.9  /  DBili  x   /  AST  14  /  ALT  9<L>  /  AlkPhos  90  03-12      Urinalysis Basic - ( 12 Mar 2019 18:05 )    Color: Yellow / Appearance: Clear / SG: >=1.030 / pH: x  Gluc: x / Ketone: 15 mg/dL  / Bili: Negative / Urobili: 0.2 E.U./dL   Blood: x / Protein: NEGATIVE mg/dL / Nitrite: POSITIVE   Leuk Esterase: NEGATIVE / RBC: < 5 /HPF / WBC 5-10 /HPF   Sq Epi: x / Non Sq Epi: 0-5 /HPF / Bacteria: Present /HPF    Thyroid Stimulating Hormone, Serum: 0.049 (03-12 @ 16:44)  Free Thyroxine, Serum (03.13.19 @ 05:38)    Free Thyroxine, Serum: 1.18 ng/dL    CAPILLARY BLOOD GLUCOSE

## 2019-03-13 NOTE — CONSULT NOTE ADULT - ATTENDING COMMENTS
A/P:89y Female with hx of rapidly progressive dementia admitted for furtherworkup    1.  Abnormal TFTs - follow up total T3, possibly related to outpatient lithium use, subclinical in nature, please check TPO and Tg antibody.   will consider thyroid US pending above labs.     2.  Hyperlipidemia - pt with hx of CVA, would check lipid profile, maintain LDL < 100    Will continue to monitor     Pt is advised to follow up with me at discharge.  Please call  to make an appointment. A/P:89y Female with hx of rapidly progressive dementia admitted for furtherworkup    1.  Abnormal TFTs - follow up total T3, possibly related to outpatient lithium use, subclinical in nature, please check TPO and Tg antibody.   will consider thyroid US pending above labs.     2.  Hyperlipidemia - pt with hx of CVA, would check lipid profile, maintain LDL < 100    3.  Dementia-  check niacin (B3) level.     Will continue to monitor     Pt is advised to follow up with me at discharge.  Please call  to make an appointment.

## 2019-03-13 NOTE — PHYSICAL THERAPY INITIAL EVALUATION ADULT - PLANNED THERAPY INTERVENTIONS, PT EVAL
balance training/bed mobility training/transfer training/gait training/postural re-education/strengthening

## 2019-03-13 NOTE — PHYSICAL THERAPY INITIAL EVALUATION ADULT - ADDITIONAL COMMENTS
Patient came from [facility], patient is somewhat poor historian, knows name, date, where she is, but is unable to state what she was doing at rehab. She cannot recall if she was ambulating. Patient states at her house there is someone who helps but not sure how many days or hours. States she uses cane and RW, has grab bars and shower chair in bathroom.

## 2019-03-14 DIAGNOSIS — R01.1 CARDIAC MURMUR, UNSPECIFIED: ICD-10-CM

## 2019-03-14 LAB
ANION GAP SERPL CALC-SCNC: 11 MMOL/L — SIGNIFICANT CHANGE UP (ref 5–17)
BUN SERPL-MCNC: 20 MG/DL — SIGNIFICANT CHANGE UP (ref 7–23)
CALCIUM SERPL-MCNC: 9.6 MG/DL — SIGNIFICANT CHANGE UP (ref 8.4–10.5)
CHLORIDE SERPL-SCNC: 106 MMOL/L — SIGNIFICANT CHANGE UP (ref 96–108)
CHOLEST SERPL-MCNC: 161 MG/DL — SIGNIFICANT CHANGE UP (ref 10–199)
CK SERPL-CCNC: 37 U/L — SIGNIFICANT CHANGE UP (ref 25–170)
CO2 SERPL-SCNC: 24 MMOL/L — SIGNIFICANT CHANGE UP (ref 22–31)
CREAT SERPL-MCNC: 0.86 MG/DL — SIGNIFICANT CHANGE UP (ref 0.5–1.3)
CRP SERPL-MCNC: 0.07 MG/DL — SIGNIFICANT CHANGE UP (ref 0–0.4)
GLUCOSE SERPL-MCNC: 94 MG/DL — SIGNIFICANT CHANGE UP (ref 70–99)
HCT VFR BLD CALC: 42.4 % — SIGNIFICANT CHANGE UP (ref 34.5–45)
HDLC SERPL-MCNC: 47 MG/DL — LOW
HGB BLD-MCNC: 14.2 G/DL — SIGNIFICANT CHANGE UP (ref 11.5–15.5)
LIPID PNL WITH DIRECT LDL SERPL: 77 MG/DL — SIGNIFICANT CHANGE UP
MAGNESIUM SERPL-MCNC: 1.5 MG/DL — LOW (ref 1.6–2.6)
MCHC RBC-ENTMCNC: 31.6 PG — SIGNIFICANT CHANGE UP (ref 27–34)
MCHC RBC-ENTMCNC: 33.5 GM/DL — SIGNIFICANT CHANGE UP (ref 32–36)
MCV RBC AUTO: 94.2 FL — SIGNIFICANT CHANGE UP (ref 80–100)
MYOGLOBIN SERPL-MCNC: 31 NG/ML — SIGNIFICANT CHANGE UP (ref 9–82)
NRBC # BLD: 0 /100 WBCS — SIGNIFICANT CHANGE UP (ref 0–0)
PHOSPHATE SERPL-MCNC: 2.3 MG/DL — LOW (ref 2.5–4.5)
PLATELET # BLD AUTO: 255 K/UL — SIGNIFICANT CHANGE UP (ref 150–400)
POTASSIUM SERPL-MCNC: 3.6 MMOL/L — SIGNIFICANT CHANGE UP (ref 3.5–5.3)
POTASSIUM SERPL-SCNC: 3.6 MMOL/L — SIGNIFICANT CHANGE UP (ref 3.5–5.3)
RBC # BLD: 4.5 M/UL — SIGNIFICANT CHANGE UP (ref 3.8–5.2)
RBC # FLD: 12.9 % — SIGNIFICANT CHANGE UP (ref 10.3–14.5)
RHEUMATOID FACT SERPL-ACNC: <10 IU/ML — SIGNIFICANT CHANGE UP (ref 0–13)
SODIUM SERPL-SCNC: 141 MMOL/L — SIGNIFICANT CHANGE UP (ref 135–145)
T3 SERPL-MCNC: 81 NG/DL — SIGNIFICANT CHANGE UP (ref 80–200)
THYROGLOB AB FLD IA-ACNC: <20 IU/ML — SIGNIFICANT CHANGE UP (ref 0–40)
THYROGLOB AB SERPL-ACNC: <20 IU/ML — SIGNIFICANT CHANGE UP (ref 0–40)
THYROGLOB SERPL-MCNC: 6.6 NG/ML — SIGNIFICANT CHANGE UP (ref 1.6–59.9)
THYROPEROXIDASE AB SERPL-ACNC: 20.4 IU/ML — SIGNIFICANT CHANGE UP (ref 0–34.9)
TOTAL CHOLESTEROL/HDL RATIO MEASUREMENT: 3.4 RATIO — SIGNIFICANT CHANGE UP (ref 3.3–7.1)
TRIGL SERPL-MCNC: 183 MG/DL — HIGH (ref 10–149)
VIT B12 SERPL-MCNC: 1436 PG/ML — HIGH (ref 232–1245)
WBC # BLD: 6.72 K/UL — SIGNIFICANT CHANGE UP (ref 3.8–10.5)
WBC # FLD AUTO: 6.72 K/UL — SIGNIFICANT CHANGE UP (ref 3.8–10.5)

## 2019-03-14 PROCEDURE — 70551 MRI BRAIN STEM W/O DYE: CPT | Mod: 26

## 2019-03-14 PROCEDURE — 95951: CPT | Mod: 26

## 2019-03-14 PROCEDURE — 93010 ELECTROCARDIOGRAM REPORT: CPT

## 2019-03-14 RX ADMIN — POLYETHYLENE GLYCOL 3350 17 GRAM(S): 17 POWDER, FOR SOLUTION ORAL at 12:24

## 2019-03-14 RX ADMIN — SODIUM CHLORIDE 90 MILLILITER(S): 9 INJECTION INTRAMUSCULAR; INTRAVENOUS; SUBCUTANEOUS at 08:56

## 2019-03-14 RX ADMIN — HEPARIN SODIUM 5000 UNIT(S): 5000 INJECTION INTRAVENOUS; SUBCUTANEOUS at 00:46

## 2019-03-14 RX ADMIN — HEPARIN SODIUM 5000 UNIT(S): 5000 INJECTION INTRAVENOUS; SUBCUTANEOUS at 08:53

## 2019-03-14 RX ADMIN — FAMOTIDINE 20 MILLIGRAM(S): 10 INJECTION INTRAVENOUS at 12:23

## 2019-03-14 RX ADMIN — Medication 81 MILLIGRAM(S): at 12:23

## 2019-03-14 RX ADMIN — HEPARIN SODIUM 5000 UNIT(S): 5000 INJECTION INTRAVENOUS; SUBCUTANEOUS at 23:43

## 2019-03-14 RX ADMIN — ATORVASTATIN CALCIUM 40 MILLIGRAM(S): 80 TABLET, FILM COATED ORAL at 23:43

## 2019-03-14 RX ADMIN — SENNA PLUS 1 TABLET(S): 8.6 TABLET ORAL at 12:24

## 2019-03-14 RX ADMIN — AMLODIPINE BESYLATE 10 MILLIGRAM(S): 2.5 TABLET ORAL at 07:00

## 2019-03-14 NOTE — PROGRESS NOTE ADULT - SUBJECTIVE AND OBJECTIVE BOX
Neurology Follow up note    Name  LEONARDO MCCORMACK    HPI:  89F w/ dementia presents w/ chronically progressively worsening mental status.  History obtain from ED chart note as patient unable to provide history and upon asking , he said that they recommend she come in but did not know why. (12 Mar 2019 13:37)      Interval History - PET consistent with Alzheimer's         REVIEW OF SYSTEMS    Vital Signs Last 24 Hrs  T(C): 36.7 (14 Mar 2019 05:21), Max: 36.7 (14 Mar 2019 05:21)  T(F): 98.1 (14 Mar 2019 05:21), Max: 98.1 (14 Mar 2019 05:21)  HR: 60 (14 Mar 2019 05:21) (60 - 70)  BP: 138/74 (14 Mar 2019 05:21) (132/66 - 164/89)  BP(mean): --  RR: 17 (14 Mar 2019 05:21) (16 - 18)  SpO2: 97% (14 Mar 2019 05:21) (94% - 97%)    Physical Exam-     Mental Status- awake and responsive to commands     Cranial Nerves- full EOM     Gait and station- n/a    Motor- moves all 4 extremities    Reflexes- decreased    Sensation- no sensory level    Coordination- no tremors     Vascular - no bruits    Medications  amLODIPine   Tablet 10 milliGRAM(s) Oral daily  aspirin enteric coated 81 milliGRAM(s) Oral daily  atorvastatin 40 milliGRAM(s) Oral at bedtime  famotidine    Tablet 20 milliGRAM(s) Oral daily  heparin  Injectable 5000 Unit(s) SubCutaneous every 8 hours  polyethylene glycol 3350 17 Gram(s) Oral daily  senna 1 Tablet(s) Oral daily  sodium chloride 0.9%. 1000 milliLiter(s) IV Continuous <Continuous>      Lab      Radiology    Assessment-  Cognitive impairment     Plan  FU withy MRI head as OP

## 2019-03-14 NOTE — PROGRESS NOTE ADULT - SUBJECTIVE AND OBJECTIVE BOX
Physical Medicine and Rehabilitation Progress Note:    Patient is a 89y old  Female who presents with a chief complaint of Progressively worsening dementia (14 Mar 2019 14:25)      HPI:  89F w/ dementia presents w/ chronically progressively worsening mental status.  History obtain from ED chart note as patient unable to provide history and upon asking , he said that they recommend she come in but did not know why. (12 Mar 2019 13:37)                            14.2   6.72  )-----------( 255      ( 14 Mar 2019 08:06 )             42.4       03-14    141  |  106  |  20  ----------------------------<  94  3.6   |  24  |  0.86    Ca    9.6      14 Mar 2019 08:06  Phos  2.3     03-14  Mg     1.5     03-14      Vital Signs Last 24 Hrs  T(C): 36.8 (14 Mar 2019 14:40), Max: 36.8 (14 Mar 2019 14:40)  T(F): 98.2 (14 Mar 2019 14:40), Max: 98.2 (14 Mar 2019 14:40)  HR: 74 (14 Mar 2019 14:40) (60 - 74)  BP: 131/77 (14 Mar 2019 14:40) (131/77 - 164/89)  BP(mean): --  RR: 16 (14 Mar 2019 14:40) (16 - 18)  SpO2: 96% (14 Mar 2019 14:40) (96% - 97%)    MEDICATIONS  (STANDING):  amLODIPine   Tablet 10 milliGRAM(s) Oral daily  aspirin enteric coated 81 milliGRAM(s) Oral daily  atorvastatin 40 milliGRAM(s) Oral at bedtime  famotidine    Tablet 20 milliGRAM(s) Oral daily  heparin  Injectable 5000 Unit(s) SubCutaneous every 8 hours  polyethylene glycol 3350 17 Gram(s) Oral daily  senna 1 Tablet(s) Oral daily    MEDICATIONS  (PRN):    Currently Undergoing Physical Therapy at bedside.    Functional Status Assessment:    Previous Level of Function:     · Additional Comments	Patient came from [facility], patient is somewhat poor historian, knows name, date, where she is, but is unable to state what she was doing at rehab. She cannot recall if she was ambulating. Patient states at her house there is someone who helps but not sure how many days or hours. States she uses cane and RW, has grab bars and shower chair in bathroom.	    Cognitive Status Examination:   · Orientation	oriented to person, place, time and situation	  · Level of Consciousness	confused	  · Follows Commands and Answers Questions	100% of the time; able to follow single-step instructions; with cues	    Range of Motion Exam:   · Range of Motion Examination	no ROM deficits were identified	    Manual Muscle Testing:   · Manual Muscle Testing Results	grossly assessed due to  functional mobility BUE & BLE 3/5 	    Bed Mobility: Rolling/Turning:     · Level of Golden	moderate assist (50% patients effort)	  · Physical Assist/Nonphysical Assist	supervision; verbal cues; 1 person assist	  · Assistive Device	bed rails	    Bed Mobility: Scooting/Bridging:     · Level of Golden	moderate assist (50% patients effort)	  · Physical Assist/Nonphysical Assist	1 person assist; verbal cues; supervision; set-up required	    Bed Mobility: Sit to Supine:     · Level of Golden	moderate assist (50% patients effort)	  · Physical Assist/Nonphysical Assist	supervision; set-up required; verbal cues; 1 person assist	  · Assistive Device	bed rails	    Bed Mobility: Supine to Sit:     · Level of Golden	maximum assist (25% patients effort)	  · Physical Assist/Nonphysical Assist	1 person assist; supervision; verbal cues	  · Assistive Device	bed rails	    Bed Mobility Analysis:     · Bed Mobility Limitations	decreased ability to use arms for pushing/pulling; decreased ability to use legs for bridging/pushing; impaired ability to control trunk for mobility	  · Impairments Contributing to Impaired Bed Mobility	decreased flexibility; decreased strength; impaired balance	    Transfer: Sit to Stand:     · Level of Golden	unable to perform; patient with retropulsion	    Balance Skills Assessment:     · Sitting Balance: Static	fair balance  slow lean posteriorly 	  · Sitting Balance: Dynamic	fair balance  slow lean posteriorly 	  · Systems Impairment Contributing to Balance Disturbance	musculoskeletal	  · Identified Impairments Contributing to Balance Disturbance	decreased strength	    Sensory Examination:   Sensory Examination:    Grossly Intact:   · Gross Sensory Examination	Grossly Intact; Left UE; Right UE; Left LE; Right LE	      Light Touch Sensation:   · Left UE	within normal limits 	  · Right UE	within normal limits 	  · Left LE	within normal limits 	  · Right LE	within normal limits 	      Clinical Impressions:   · Criteria for Skilled Therapeutic Interventions	impairments found; functional limitations in following categories; rehab potential; therapy frequency; anticipated discharge recommendation	  · Impairments Found (describe specific impairments)	gait, locomotion, and balance; gross motor; posture; muscle strength; ROM	  · Functional Limitations in Following Categories (describe specific limitations)	self-care	  · Rehab Potential	fair, will monitor progress closely	  · Therapy Frequency	2-3x/week	        PM&R Impression: as above    Disposition Plan Recommendations: subacute rehab

## 2019-03-14 NOTE — PROGRESS NOTE ADULT - NSICDXPROBLEM_GEN_ALL_CORE_FT
PROBLEM DIAGNOSES  Problem: Dementia without behavioral disturbance, unspecified dementia type  Assessment and Plan: Acutely worsening on chronic dementia.    - neurology following  - ordered Thyroid peroxidase CPK, myoglobin, RF, SSA, SSA, CRP, SPEP, , LORA  - pending MRI head  - VEEG demonstrated L temporal slowing   - past records show prescriptions for Xanax, Lithium, Buspar, Donepezil etc but called  and he states she has been off of these medications for about 2 months after prior hospitalization. Pt is AOx3 now and confirms she is not taking these medications for months but is poor historian  - TFT inconsistent with thyroid illness     Problem: Holosystolic murmur  Assessment and Plan: Pt with holosystolic murmur on physical exam.  - EKG consistent with NSR with LBBB   - called Dr. Ball (cardiology), will attempt again tomorrow AM    Problem: HTN (hypertension)  Assessment and Plan: hx of HTN  - c/w Norvasc 10mg QD      Problem: Need for prophylactic measure  Assessment and Plan: F c/w IVF NS @ 90cc/hr  E replete prn  N Regular diet with Ensure     Problem: GERD (gastroesophageal reflux disease)  Assessment and Plan: continue with Pepcid 20mg

## 2019-03-14 NOTE — PROGRESS NOTE ADULT - SUBJECTIVE AND OBJECTIVE BOX
INTERVAL HPI/OVERNIGHT EVENTS:    Patient is a 89y old  Female who presents with a chief complaint of Progressively worsening dementia  pt today reports feeling generally weak, lack of interest in activities, no specific pain, no nausea, no vomiting, no constipation or diarrhea.   no acute overnight events.   pt reports decreased appetite.       Pt reports the following symptoms:    CONSTITUTIONAL:  Negative fever or chills, feels well, good appetite  EYES:  Negative  blurry vision or double vision  CARDIOVASCULAR:  Negative for chest pain or palpitations  RESPIRATORY:  Negative for cough, wheezing, or SOB   GASTROINTESTINAL:  Negative for nausea, vomiting, diarrhea, constipation, or abdominal pain  GENITOURINARY:  Negative frequency, urgency or dysuria  NEUROLOGIC:  No headache, confusion, dizziness, lightheadedness    MEDICATIONS  (STANDING):  amLODIPine   Tablet 10 milliGRAM(s) Oral daily  aspirin enteric coated 81 milliGRAM(s) Oral daily  atorvastatin 40 milliGRAM(s) Oral at bedtime  famotidine    Tablet 20 milliGRAM(s) Oral daily  heparin  Injectable 5000 Unit(s) SubCutaneous every 8 hours  polyethylene glycol 3350 17 Gram(s) Oral daily  senna 1 Tablet(s) Oral daily    MEDICATIONS  (PRN):      Past medical history reviewed  Family history reviewed  Social history reviewed    PHYSICAL EXAM  Vital Signs Last 24 Hrs  T(C): 36.7 (14 Mar 2019 05:21), Max: 36.7 (14 Mar 2019 05:21)  T(F): 98.1 (14 Mar 2019 05:21), Max: 98.1 (14 Mar 2019 05:21)  HR: 60 (14 Mar 2019 05:21) (60 - 70)  BP: 138/74 (14 Mar 2019 05:21) (138/74 - 164/89)  BP(mean): --  RR: 17 (14 Mar 2019 05:21) (17 - 18)  SpO2: 97% (14 Mar 2019 05:21) (97% - 97%)    Constitutional: wn/wd in NAD.   HEENT: NCAT, MMM, OP clear, EOMI, no proptosis or lid retraction  Neck: no thyromegaly or palpable thyroid nodules   Respiratory: lungs CTAB.  Cardiovascular: irregular rhythm, normal S1 and S2, harsh systolic murmurs, no peripheral edema  GI: soft, NT/ND, no masses/HSM appreciated.  Neurology: no tremors, DTR 2+  Skin: no visible rashes/lesions  Psychiatric: AAO x 3, normal affect/mood.    LABS:                        14.2   6.72  )-----------( 255      ( 14 Mar 2019 08:06 )             42.4     03-14    141  |  106  |  20  ----------------------------<  94  3.6   |  24  |  0.86    Ca    9.6      14 Mar 2019 08:06  Phos  2.3     03-14  Mg     1.5     03-14        Urinalysis Basic - ( 12 Mar 2019 18:05 )    Color: Yellow / Appearance: Clear / SG: >=1.030 / pH: x  Gluc: x / Ketone: 15 mg/dL  / Bili: Negative / Urobili: 0.2 E.U./dL   Blood: x / Protein: NEGATIVE mg/dL / Nitrite: POSITIVE   Leuk Esterase: NEGATIVE / RBC: < 5 /HPF / WBC 5-10 /HPF   Sq Epi: x / Non Sq Epi: 0-5 /HPF / Bacteria: Present /HPF      Thyroid Stimulating Hormone, Serum: 0.049 uIU/mL (03-12 @ 16:44)  Triiodothyronine, Total (T3 Total) (03.14.19 @ 01:43)    Triiodothyronine, Total (T3 Total): 81 ng/dL    Free Thyroxine, Serum (03.13.19 @ 05:38)    Free Thyroxine, Serum: 1.18 ng/dL    Direct LDL: 77 mg/dL (03-14-19 @ 08:06)

## 2019-03-14 NOTE — PROGRESS NOTE ADULT - SUBJECTIVE AND OBJECTIVE BOX
OVERNIGHT EVENTS: HECTOR    SUBJECTIVE / INTERVAL HPI: Patient seen and examined at bedside. Pt is resting comfortably in bed. VEEG is disconnected. Pt is AOx3 and states she is feeling well with no complaints. Denies any CP, SOB, Palpitations, N/V, Diarrhea, F, or chills.     VITAL SIGNS:  Vital Signs Last 24 Hrs  T(C): 36.8 (14 Mar 2019 14:40), Max: 36.8 (14 Mar 2019 14:40)  T(F): 98.2 (14 Mar 2019 14:40), Max: 98.2 (14 Mar 2019 14:40)  HR: 74 (14 Mar 2019 14:40) (60 - 74)  BP: 131/77 (14 Mar 2019 14:40) (131/77 - 164/89)  BP(mean): --  RR: 16 (14 Mar 2019 14:40) (16 - 18)  SpO2: 96% (14 Mar 2019 14:40) (96% - 97%)    PHYSICAL EXAM:    General: WDWN, elderly female   HEENT: NC/AT; PERRL, anicteric sclera; MMM  Neck: supple  Cardiovascular: +S1/S2; RRR, holosystolic murmur ausculted on all chest fields   Respiratory: CTA B/L; no W/R/R  Gastrointestinal: soft, NT/ND; +BSx4  Extremities: WWP; no edema, clubbing or cyanosis  Vascular: 2+ radial, DP/PT pulses B/L  Neurological: AAOx3; no focal deficits    MEDICATIONS:  MEDICATIONS  (STANDING):  amLODIPine   Tablet 10 milliGRAM(s) Oral daily  aspirin enteric coated 81 milliGRAM(s) Oral daily  atorvastatin 40 milliGRAM(s) Oral at bedtime  famotidine    Tablet 20 milliGRAM(s) Oral daily  heparin  Injectable 5000 Unit(s) SubCutaneous every 8 hours  polyethylene glycol 3350 17 Gram(s) Oral daily  senna 1 Tablet(s) Oral daily    MEDICATIONS  (PRN):      ALLERGIES:  Allergies    Bactrim (Rash)  penicillins (Rash)    Intolerances        LABS:                        14.2   6.72  )-----------( 255      ( 14 Mar 2019 08:06 )             42.4     03-14    141  |  106  |  20  ----------------------------<  94  3.6   |  24  |  0.86    Ca    9.6      14 Mar 2019 08:06  Phos  2.3     03-14  Mg     1.5     03-14        Urinalysis Basic - ( 12 Mar 2019 18:05 )    Color: Yellow / Appearance: Clear / SG: >=1.030 / pH: x  Gluc: x / Ketone: 15 mg/dL  / Bili: Negative / Urobili: 0.2 E.U./dL   Blood: x / Protein: NEGATIVE mg/dL / Nitrite: POSITIVE   Leuk Esterase: NEGATIVE / RBC: < 5 /HPF / WBC 5-10 /HPF   Sq Epi: x / Non Sq Epi: 0-5 /HPF / Bacteria: Present /HPF      CAPILLARY BLOOD GLUCOSE          RADIOLOGY & ADDITIONAL TESTS: Reviewed.

## 2019-03-14 NOTE — DISCHARGE NOTE PROVIDER - NSDCCPCAREPLAN_GEN_ALL_CORE_FT
PRINCIPAL DISCHARGE DIAGNOSIS  Diagnosis: Dementia without behavioral disturbance, unspecified dementia type  Assessment and Plan of Treatment:       SECONDARY DISCHARGE DIAGNOSES  Diagnosis: GERD (gastroesophageal reflux disease)  Assessment and Plan of Treatment:     Diagnosis: HTN (hypertension)  Assessment and Plan of Treatment: PRINCIPAL DISCHARGE DIAGNOSIS  Diagnosis: Alzheimer's type dementia  Assessment and Plan of Treatment: You were admitted to the hospital for worsening confusion and alterd mental status. You had a PET scan and an MRI consistent with Alzheimer's related dementia and chronic stroke. Alzheimer's is the most common cause of dementia, a general term for memory loss and other cognitive abilities serious enough to interfere with daily life. Upon discharge, continue to participate in daily activities as tolerated. Please be sure to follow up with a primary care doctor and neurologist in 10-14 days.      SECONDARY DISCHARGE DIAGNOSES  Diagnosis: GERD (gastroesophageal reflux disease)  Assessment and Plan of Treatment: You have a history of reflux. Please take your medications as prescribed and follow up with a primary care doctor in 10-14 days.    Diagnosis: HTN (hypertension)  Assessment and Plan of Treatment: You have a history of high blood pressure. Please continue taking your Norvasc as prescribed and follow up with a primary care doctor in 10-14 days. PRINCIPAL DISCHARGE DIAGNOSIS  Diagnosis: Alzheimer's type dementia  Assessment and Plan of Treatment: You were admitted to the hospital for worsening confusion and alterd mental status. You had a PET scan and an MRI consistent with Alzheimer's related dementia and chronic stroke. Alzheimer's is the most common cause of dementia, a general term for memory loss and other cognitive abilities serious enough to interfere with daily life. Upon discharge, continue to participate in daily activities as tolerated. Please be sure to follow up with a primary care doctor and neurologist in 10-14 days.      SECONDARY DISCHARGE DIAGNOSES  Diagnosis: GERD (gastroesophageal reflux disease)  Assessment and Plan of Treatment: You have a history of reflux. Please take your medications as prescribed and follow up with a primary care doctor in 10-14 days.    Diagnosis: Cerebrovascular accident (CVA)  Assessment and Plan of Treatment: You had a recent history of stroke. Upon discharge, please continue to take Aspirin and Lipitor as instructed.    Diagnosis: HTN (hypertension)  Assessment and Plan of Treatment: You have a history of high blood pressure. Please continue taking your Norvasc as prescribed and follow up with a primary care doctor in 10-14 days.

## 2019-03-14 NOTE — PROGRESS NOTE ADULT - ATTENDING COMMENTS
A/P: 89y Female with history of psychoactive polypharmacy presenting for altered mental status, rapidly progressive dementia, with abnormal TFTs, most likely due to non thyroidal illness.     1.  Abnormal TFTs - TSH low normal FT4, TT3 is at low end of normal suggestive of non thyroidal illness.   follow up anti-thyroid antibodies, no tx at this time.   2. Osteoporosis - pt reports multiple fractures, recommend outpatient evaluation for osteoporosis treatment.       Pt can follow up with me at discharge by calling  to make an appointment.

## 2019-03-14 NOTE — DISCHARGE NOTE PROVIDER - HOSPITAL COURSE
89F with PMHx of recent CVA (unclear etiology, on ASA and Lipitor), dementia, HTN, chronic constipation, GERD who presents from outpatient neurologist's office for concern for acute progressive memory loss requiring inpatient workup. Patient without signs/symptoms of infectious etiology, no leukocytosis, no fever. Endocrinology consulted for elevated TSH, however free T4 and T3 wnl making hypothyroid unlikely. Rheumatology consulted for concern for myopathy, however patient with strength throughout, CK level wnl. Patient underwent MRI brain which showed generalized volume loss with small vessel ischemic and lacunar disease. No acute ischemia. 89F with PMHx of recent CVA (unclear etiology, on ASA and Lipitor), dementia, HTN, chronic constipation, GERD who presents from outpatient neurologist's office for concern for acute progressive memory loss requiring inpatient workup. Patient without signs/symptoms of infectious etiology, no leukocytosis, no fever. EEG placed and patient reported to have L temporal generalized slowing, no epileptiform activity. Endocrinology consulted for elevated TSH, however free T4 and T3 wnl making hypothyroid unlikely. Thyroid antibodies also negative. Rheumatology consulted for concern for myopathy, however patient with strength throughout, CK level wnl. Psych evaluated patient and recommended no pharmacological treatment. Patient underwent MRI brain which showed generalized volume loss with small vessel ischemic and lacunar disease, no acute ischemia. Vitamin and RPR noncontributory. Patient with improved mentation throughout hospitalization, AAOx1-2 (at baseline per ). Stable for return to [facility] nursing home.

## 2019-03-15 VITALS
TEMPERATURE: 98 F | OXYGEN SATURATION: 96 % | RESPIRATION RATE: 19 BRPM | HEART RATE: 60 BPM | SYSTOLIC BLOOD PRESSURE: 150 MMHG | DIASTOLIC BLOOD PRESSURE: 81 MMHG

## 2019-03-15 DIAGNOSIS — I63.9 CEREBRAL INFARCTION, UNSPECIFIED: ICD-10-CM

## 2019-03-15 LAB
DSDNA AB FLD-ACNC: <0.2 AI — SIGNIFICANT CHANGE UP
ENA SS-A AB FLD IA-ACNC: <0.2 AI — SIGNIFICANT CHANGE UP
T PALLIDUM AB TITR SER: NEGATIVE — SIGNIFICANT CHANGE UP

## 2019-03-15 PROCEDURE — 84155 ASSAY OF PROTEIN SERUM: CPT

## 2019-03-15 PROCEDURE — 80048 BASIC METABOLIC PNL TOTAL CA: CPT

## 2019-03-15 PROCEDURE — 80061 LIPID PANEL: CPT

## 2019-03-15 PROCEDURE — 83735 ASSAY OF MAGNESIUM: CPT

## 2019-03-15 PROCEDURE — 82962 GLUCOSE BLOOD TEST: CPT

## 2019-03-15 PROCEDURE — 70551 MRI BRAIN STEM W/O DYE: CPT

## 2019-03-15 PROCEDURE — 82607 VITAMIN B-12: CPT

## 2019-03-15 PROCEDURE — 83519 RIA NONANTIBODY: CPT

## 2019-03-15 PROCEDURE — A9552: CPT

## 2019-03-15 PROCEDURE — 86140 C-REACTIVE PROTEIN: CPT

## 2019-03-15 PROCEDURE — 81001 URINALYSIS AUTO W/SCOPE: CPT

## 2019-03-15 PROCEDURE — 86235 NUCLEAR ANTIGEN ANTIBODY: CPT

## 2019-03-15 PROCEDURE — 93005 ELECTROCARDIOGRAM TRACING: CPT

## 2019-03-15 PROCEDURE — 84100 ASSAY OF PHOSPHORUS: CPT

## 2019-03-15 PROCEDURE — 85027 COMPLETE CBC AUTOMATED: CPT

## 2019-03-15 PROCEDURE — 85025 COMPLETE CBC W/AUTO DIFF WBC: CPT

## 2019-03-15 PROCEDURE — 84439 ASSAY OF FREE THYROXINE: CPT

## 2019-03-15 PROCEDURE — 80053 COMPREHEN METABOLIC PANEL: CPT

## 2019-03-15 PROCEDURE — 86038 ANTINUCLEAR ANTIBODIES: CPT

## 2019-03-15 PROCEDURE — 86780 TREPONEMA PALLIDUM: CPT

## 2019-03-15 PROCEDURE — 84436 ASSAY OF TOTAL THYROXINE: CPT

## 2019-03-15 PROCEDURE — 78608 BRAIN IMAGING (PET): CPT

## 2019-03-15 PROCEDURE — 83520 IMMUNOASSAY QUANT NOS NONAB: CPT

## 2019-03-15 PROCEDURE — 99285 EMERGENCY DEPT VISIT HI MDM: CPT | Mod: 25

## 2019-03-15 PROCEDURE — 84480 ASSAY TRIIODOTHYRONINE (T3): CPT

## 2019-03-15 PROCEDURE — 84165 PROTEIN E-PHORESIS SERUM: CPT

## 2019-03-15 PROCEDURE — 97161 PT EVAL LOW COMPLEX 20 MIN: CPT

## 2019-03-15 PROCEDURE — 83874 ASSAY OF MYOGLOBIN: CPT

## 2019-03-15 PROCEDURE — 84591 ASSAY OF NOS VITAMIN: CPT

## 2019-03-15 PROCEDURE — 95951: CPT

## 2019-03-15 PROCEDURE — 86800 THYROGLOBULIN ANTIBODY: CPT

## 2019-03-15 PROCEDURE — 36415 COLL VENOUS BLD VENIPUNCTURE: CPT

## 2019-03-15 PROCEDURE — 84432 ASSAY OF THYROGLOBULIN: CPT

## 2019-03-15 PROCEDURE — 86431 RHEUMATOID FACTOR QUANT: CPT

## 2019-03-15 PROCEDURE — 86376 MICROSOMAL ANTIBODY EACH: CPT

## 2019-03-15 PROCEDURE — 82550 ASSAY OF CK (CPK): CPT

## 2019-03-15 PROCEDURE — 86334 IMMUNOFIX E-PHORESIS SERUM: CPT

## 2019-03-15 PROCEDURE — 84443 ASSAY THYROID STIM HORMONE: CPT

## 2019-03-15 PROCEDURE — 86255 FLUORESCENT ANTIBODY SCREEN: CPT

## 2019-03-15 RX ADMIN — HEPARIN SODIUM 5000 UNIT(S): 5000 INJECTION INTRAVENOUS; SUBCUTANEOUS at 09:13

## 2019-03-15 RX ADMIN — FAMOTIDINE 20 MILLIGRAM(S): 10 INJECTION INTRAVENOUS at 12:13

## 2019-03-15 RX ADMIN — Medication 81 MILLIGRAM(S): at 12:13

## 2019-03-15 RX ADMIN — POLYETHYLENE GLYCOL 3350 17 GRAM(S): 17 POWDER, FOR SOLUTION ORAL at 12:14

## 2019-03-15 RX ADMIN — SENNA PLUS 1 TABLET(S): 8.6 TABLET ORAL at 12:13

## 2019-03-15 RX ADMIN — AMLODIPINE BESYLATE 10 MILLIGRAM(S): 2.5 TABLET ORAL at 06:30

## 2019-03-15 NOTE — DISCHARGE NOTE NURSING/CASE MANAGEMENT/SOCIAL WORK - NSDCDPATPORTLINK_GEN_ALL_CORE
You can access the [facility], offered by [facility], by registering with the following website: [email or web address]

## 2019-03-15 NOTE — PROGRESS NOTE ADULT - ASSESSMENT
89F w/ progressively worsening dementia presents for work up w/ concern for vascular dementia vs advanced alzheimers, unlikely toxic metabolic encephalitis.
89F w/ progressively worsening dementia presents for work up w/ concern for vascular dementia vs advanced alzheimers, unlikely toxic metabolic encephalitis.    Problem Selector:  PROBLEM DIAGNOSES  Problem: Dementia without behavioral disturbance, unspecified dementia type  Assessment and Plan: Acutely worsening on chronic dementia.    - neurology following  - ordered Thyroid peroxidase CPK, myoglobin, RF, SSA, SSA, CRP, SPEP, , LORA  - pending MRI head  - on VEEG     Problem: GERD (gastroesophageal reflux disease)  Assessment and Plan: hx of GERD  - c/w Pepcid 20mg QD     Problem: HTN (hypertension)  Assessment and Plan: hx of HTN  - c/w Norvasc 10mg QD      Problem: Need for prophylactic measure  Assessment and Plan: F c/w IVF NS @ 90cc/hr  E replete prn  N Regular diet with Ensure.
Appreciate consults  To address psych meds per consult  Discussed with Dr. Avilez  Clinically stable for discharge today
EEG in progress To obtain further endocrine labs and evaluation and check with  about Rx with synthroid  Discussed with Dr. Avilez this am
Patient with dx of multi infarct dementia with deterioration of motor function  Now needs two person assist to get OOB and can  no longer walk with walker  To review with Dr. Avilez and discuss the workup  Her  is very realistic and understands it is unlikely that anything can be done to improve her clinical status
Stable clinically  Discussed with psych and neuro  For discharge today
89F w/ progressively worsening dementia presents for work up w/ concern for vascular dementia vs advanced alzheimers, unlikely toxic metabolic encephalitis.

## 2019-03-15 NOTE — DISCHARGE NOTE NURSING/CASE MANAGEMENT/SOCIAL WORK - NSDCCRNAME_GEN_ALL_CORE_FT
[address] [address] [facility] [address] [place] [id number] [phone]  [facility] Trip# [id number]

## 2019-03-15 NOTE — PROGRESS NOTE ADULT - REASON FOR ADMISSION
Progressively worsening dementia

## 2019-03-15 NOTE — DISCHARGE NOTE NURSING/CASE MANAGEMENT/SOCIAL WORK - NSDCPEPTSTRK_GEN_ALL_CORE
Prescribed medications/Stroke education booklet/Call 911 for stroke/Stroke warning signs and symptoms/Need for follow up after discharge/Signs and symptoms of stroke/Risk factors for stroke/Stroke support groups for patients, families, and friends

## 2019-03-16 LAB — N-METHYL-DA RECEPTOR AB IGG BY CBA-IFA, SERUM W/RFLX TITER: SIGNIFICANT CHANGE UP

## 2019-03-18 LAB
ANA TITR SER: NEGATIVE — SIGNIFICANT CHANGE UP
MAG AB SER-ACNC: NEGATIVE — SIGNIFICANT CHANGE UP

## 2019-03-19 DIAGNOSIS — K21.9 GASTRO-ESOPHAGEAL REFLUX DISEASE WITHOUT ESOPHAGITIS: ICD-10-CM

## 2019-03-19 DIAGNOSIS — Z78.1 PHYSICAL RESTRAINT STATUS: ICD-10-CM

## 2019-03-19 DIAGNOSIS — Z88.0 ALLERGY STATUS TO PENICILLIN: ICD-10-CM

## 2019-03-19 DIAGNOSIS — G30.9 ALZHEIMER'S DISEASE, UNSPECIFIED: ICD-10-CM

## 2019-03-19 DIAGNOSIS — R01.1 CARDIAC MURMUR, UNSPECIFIED: ICD-10-CM

## 2019-03-19 DIAGNOSIS — Z86.73 PERSONAL HISTORY OF TRANSIENT ISCHEMIC ATTACK (TIA), AND CEREBRAL INFARCTION WITHOUT RESIDUAL DEFICITS: ICD-10-CM

## 2019-03-19 DIAGNOSIS — R94.6 ABNORMAL RESULTS OF THYROID FUNCTION STUDIES: ICD-10-CM

## 2019-03-19 DIAGNOSIS — R26.9 UNSPECIFIED ABNORMALITIES OF GAIT AND MOBILITY: ICD-10-CM

## 2019-03-19 DIAGNOSIS — I10 ESSENTIAL (PRIMARY) HYPERTENSION: ICD-10-CM

## 2019-03-19 DIAGNOSIS — Z79.82 LONG TERM (CURRENT) USE OF ASPIRIN: ICD-10-CM

## 2019-03-19 DIAGNOSIS — I25.2 OLD MYOCARDIAL INFARCTION: ICD-10-CM

## 2019-03-19 DIAGNOSIS — E78.5 HYPERLIPIDEMIA, UNSPECIFIED: ICD-10-CM

## 2019-03-19 DIAGNOSIS — I25.10 ATHEROSCLEROTIC HEART DISEASE OF NATIVE CORONARY ARTERY WITHOUT ANGINA PECTORIS: ICD-10-CM

## 2019-03-19 DIAGNOSIS — G93.41 METABOLIC ENCEPHALOPATHY: ICD-10-CM

## 2019-03-19 DIAGNOSIS — K59.00 CONSTIPATION, UNSPECIFIED: ICD-10-CM

## 2019-03-19 DIAGNOSIS — F02.80 DEMENTIA IN OTHER DISEASES CLASSIFIED ELSEWHERE, UNSPECIFIED SEVERITY, WITHOUT BEHAVIORAL DISTURBANCE, PSYCHOTIC DISTURBANCE, MOOD DISTURBANCE, AND ANXIETY: ICD-10-CM

## 2019-03-19 DIAGNOSIS — Z88.1 ALLERGY STATUS TO OTHER ANTIBIOTIC AGENTS STATUS: ICD-10-CM

## 2019-03-19 DIAGNOSIS — Z66 DO NOT RESUSCITATE: ICD-10-CM

## 2019-03-19 LAB
PROT SERPL-MCNC: 6.3 G/DL — SIGNIFICANT CHANGE UP (ref 6–8.3)
PROT SERPL-MCNC: 6.3 G/DL — SIGNIFICANT CHANGE UP (ref 6–8.3)
VOLTAGE-GATED K CHANNEL AB RESULT: 0 PMOL/L — SIGNIFICANT CHANGE UP (ref 0–31)

## 2019-03-21 LAB
% ALBUMIN: 59.4 % — SIGNIFICANT CHANGE UP
% ALPHA 1: 4.9 % — SIGNIFICANT CHANGE UP
% ALPHA 2: 12.7 % — SIGNIFICANT CHANGE UP
% BETA: 12.1 % — SIGNIFICANT CHANGE UP
% GAMMA: 10.9 % — SIGNIFICANT CHANGE UP
ALBUMIN SERPL ELPH-MCNC: 3.7 G/DL — SIGNIFICANT CHANGE UP (ref 3.6–5.5)
ALBUMIN/GLOB SERPL ELPH: 1.4 RATIO — SIGNIFICANT CHANGE UP
ALPHA1 GLOB SERPL ELPH-MCNC: 0.3 G/DL — SIGNIFICANT CHANGE UP (ref 0.1–0.4)
ALPHA2 GLOB SERPL ELPH-MCNC: 0.8 G/DL — SIGNIFICANT CHANGE UP (ref 0.5–1)
B-GLOBULIN SERPL ELPH-MCNC: 0.8 G/DL — SIGNIFICANT CHANGE UP (ref 0.5–1)
GAMMA GLOBULIN: 0.7 G/DL — SIGNIFICANT CHANGE UP (ref 0.6–1.6)
INTERPRETATION SERPL IFE-IMP: SIGNIFICANT CHANGE UP
PROT PATTERN SERPL ELPH-IMP: SIGNIFICANT CHANGE UP

## 2019-03-22 LAB
NICOTINAMIDE: 17.3 NG/ML — SIGNIFICANT CHANGE UP (ref 5.2–72.1)
NICOTINIC ACID: <5 NG/ML — SIGNIFICANT CHANGE UP (ref 0–5)

## 2019-04-24 PROBLEM — Z00.00 ENCOUNTER FOR PREVENTIVE HEALTH EXAMINATION: Status: ACTIVE | Noted: 2019-04-24

## 2019-12-16 NOTE — PHYSICAL THERAPY INITIAL EVALUATION ADULT - BALANCE DISTURBANCE, IDENTIFIED IMPAIRMENT CONTRIBUTE, REHAB EVAL
If you are a smoker, it is important for your health to stop smoking. Please be aware that second hand smoke is also harmful.
decreased strength

## 2024-09-16 NOTE — ED ADULT NURSE NOTE - OBJECTIVE STATEMENT
Medication: hydroCHLOROthiazide (HYDRODIURIL) 12.5 MG tablet  passed protocol.   Last office visit date: 10/06/2023  Next appointment scheduled?: No;       Number of refills given: 1    Medication: losartan (COZAAR) 100 MG tablet passed protocol.   Last office visit date: 10/06/2023  Next appointment scheduled?: No;    Number of refills given: 1   Patient B[place] [facility] for confusion , as per patient " they sent me here because I need to be somewhere ," pt. denies any discomfort . Alert and oriented x 2 . Was seen by Dr. Avilez . Safety maintained . Will continue to monitor .